# Patient Record
Sex: MALE | Race: OTHER
[De-identification: names, ages, dates, MRNs, and addresses within clinical notes are randomized per-mention and may not be internally consistent; named-entity substitution may affect disease eponyms.]

---

## 2018-03-05 ENCOUNTER — HOSPITAL ENCOUNTER (EMERGENCY)
Dept: HOSPITAL 8 - ED | Age: 28
Discharge: HOME | End: 2018-03-05
Payer: COMMERCIAL

## 2018-03-05 VITALS — WEIGHT: 305.34 LBS | HEIGHT: 74 IN | BODY MASS INDEX: 39.19 KG/M2

## 2018-03-05 VITALS — DIASTOLIC BLOOD PRESSURE: 94 MMHG | SYSTOLIC BLOOD PRESSURE: 160 MMHG

## 2018-03-05 DIAGNOSIS — R21: Primary | ICD-10-CM

## 2018-03-05 PROCEDURE — 99283 EMERGENCY DEPT VISIT LOW MDM: CPT

## 2023-01-26 PROBLEM — S62.213A: Status: ACTIVE | Noted: 2023-01-26

## 2025-03-19 ENCOUNTER — OFFICE VISIT (OUTPATIENT)
Dept: URGENT CARE | Facility: PHYSICIAN GROUP | Age: 35
End: 2025-03-19
Payer: COMMERCIAL

## 2025-03-19 ENCOUNTER — HOSPITAL ENCOUNTER (OUTPATIENT)
Facility: MEDICAL CENTER | Age: 35
End: 2025-03-19
Attending: PHYSICIAN ASSISTANT
Payer: COMMERCIAL

## 2025-03-19 VITALS
TEMPERATURE: 97.7 F | SYSTOLIC BLOOD PRESSURE: 132 MMHG | RESPIRATION RATE: 16 BRPM | BODY MASS INDEX: 32.25 KG/M2 | HEIGHT: 72 IN | HEART RATE: 82 BPM | WEIGHT: 238.1 LBS | DIASTOLIC BLOOD PRESSURE: 80 MMHG | OXYGEN SATURATION: 98 %

## 2025-03-19 DIAGNOSIS — N50.812 LEFT TESTICULAR PAIN: ICD-10-CM

## 2025-03-19 LAB
APPEARANCE UR: CLEAR
BILIRUB UR STRIP-MCNC: NEGATIVE MG/DL
COLOR UR AUTO: YELLOW
GLUCOSE UR STRIP.AUTO-MCNC: NEGATIVE MG/DL
KETONES UR STRIP.AUTO-MCNC: NEGATIVE MG/DL
LEUKOCYTE ESTERASE UR QL STRIP.AUTO: NEGATIVE
NITRITE UR QL STRIP.AUTO: NEGATIVE
PH UR STRIP.AUTO: 6.5 [PH] (ref 5–8)
PROT UR QL STRIP: NEGATIVE MG/DL
RBC UR QL AUTO: NEGATIVE
SP GR UR STRIP.AUTO: 1.02
UROBILINOGEN UR STRIP-MCNC: 1 MG/DL

## 2025-03-19 PROCEDURE — 81002 URINALYSIS NONAUTO W/O SCOPE: CPT | Performed by: PHYSICIAN ASSISTANT

## 2025-03-19 PROCEDURE — 3075F SYST BP GE 130 - 139MM HG: CPT | Performed by: PHYSICIAN ASSISTANT

## 2025-03-19 PROCEDURE — 87491 CHLMYD TRACH DNA AMP PROBE: CPT

## 2025-03-19 PROCEDURE — 99204 OFFICE O/P NEW MOD 45 MIN: CPT | Performed by: PHYSICIAN ASSISTANT

## 2025-03-19 PROCEDURE — 87591 N.GONORRHOEAE DNA AMP PROB: CPT

## 2025-03-19 PROCEDURE — 3079F DIAST BP 80-89 MM HG: CPT | Performed by: PHYSICIAN ASSISTANT

## 2025-03-19 RX ORDER — DOXYCYCLINE HYCLATE 100 MG
100 TABLET ORAL 2 TIMES DAILY
Qty: 14 TABLET | Refills: 0 | Status: SHIPPED | OUTPATIENT
Start: 2025-03-19 | End: 2025-03-26

## 2025-03-19 ASSESSMENT — ENCOUNTER SYMPTOMS
CHILLS: 0
NAUSEA: 0
VOMITING: 0
FEVER: 0
FLANK PAIN: 0
ABDOMINAL PAIN: 0

## 2025-03-20 LAB
C TRACH DNA SPEC QL NAA+PROBE: NEGATIVE
N GONORRHOEA DNA SPEC QL NAA+PROBE: NEGATIVE
SPECIMEN SOURCE: NORMAL

## 2025-03-20 NOTE — PROGRESS NOTES
"Subjective:     CHIEF COMPLAINT  Chief Complaint   Patient presents with    Testicle Pain     Left testicle pain that started 1 day ago patient states did have unprotected sex x4 days ago with a new partner.        HPI  Drake Farfan is a very pleasant 34 y.o. male who presents to the clinic with left testicular pain x 1 day.  Patient states he had unprotected sex with a new partner 4 days ago.  Yesterday he developed a dull ache behind his left testicle.  States it feels like \"blue balls\".  He has not noted any scrotal swelling, rash or discoloration.  Denies any dysuria, hematuria or penile discharge.  No history of nephrolithiasis.  Denies any prior hernia or noticing a bulge.  No fevers, chills, nausea or vomiting.    REVIEW OF SYSTEMS  Review of Systems   Constitutional:  Negative for chills, fever and malaise/fatigue.   Gastrointestinal:  Negative for abdominal pain, nausea and vomiting.   Genitourinary:  Negative for dysuria, flank pain, frequency, hematuria and urgency.        Left testicular pain       PAST MEDICAL HISTORY  Patient Active Problem List    Diagnosis Date Noted    Almanzar's fracture 01/26/2023       SURGICAL HISTORY   has a past surgical history that includes closed reduction, fracture, metacarpal bone, with pinning (Right, 2/3/2023).    ALLERGIES  No Known Allergies    CURRENT MEDICATIONS  Home Medications       Reviewed by Estrada Ferguson P.A.-C. (Physician Assistant) on 03/19/25 at 1716  Med List Status: <None>     Medication Last Dose Status        Patient Khalif Taking any Medications                           SOCIAL HISTORY  Social History     Tobacco Use    Smoking status: Former     Types: Cigarettes    Smokeless tobacco: Former   Vaping Use    Vaping status: Never Used   Substance and Sexual Activity    Alcohol use: Yes    Drug use: Not Currently    Sexual activity: Yes     Partners: Female     Birth control/protection: None       FAMILY HISTORY  History reviewed. No pertinent " family history.       Objective:     VITAL SIGNS: /80   Pulse 82   Temp 36.5 °C (97.7 °F) (Temporal)   Resp 16   Ht 1.829 m (6')   Wt 108 kg (238 lb 1.6 oz)   SpO2 98%   BMI 32.29 kg/m²     PHYSICAL EXAM  Physical Exam  Constitutional:       General: He is not in acute distress.     Appearance: Normal appearance. He is not ill-appearing, toxic-appearing or diaphoretic.   HENT:      Head: Normocephalic and atraumatic.   Eyes:      Conjunctiva/sclera: Conjunctivae normal.   Pulmonary:      Effort: Pulmonary effort is normal.   Genitourinary:     Comments: Patient declined  exam  Musculoskeletal:      Cervical back: Normal range of motion.   Neurological:      General: No focal deficit present.      Mental Status: He is alert and oriented to person, place, and time. Mental status is at baseline.         Assessment/Plan:     1. Left testicular pain  - POCT Urinalysis  - Chlamydia/GC, PCR (Urine); Future  - cefTRIAXone (Rocephin) 500 mg in lidocaine (Xylocaine) 1 % 2 mL for IM use  - doxycycline (VIBRAMYCIN) 100 MG Tab; Take 1 Tablet by mouth 2 times a day for 7 days.  Dispense: 14 Tablet; Refill: 0      MDM/Comments:    Pleasant and well-appearing 34-year-old male presented to the clinic with left testicular pain x 1 day.  Patient states the pain is located behind his left testicle.  He denies any associated scrotal swelling.  Denies any dysuria or penile discharge.  Has not noted any mass or bulge.  Patient declined  exam.  Urinalysis returned reassuring without signs of infection.  No hematuria.  Recently sexually active with a new partner.  We discussed differentials including epididymitis, testicular torsion, varicocele, spermatocele, STI and nephrolithiasis.  We elected to begin empiric treatment for epididymitis with a course of Rocephin and doxycycline.  We will send out for GC chlamydia testing and I will follow-up once available.  Return/ED precautions discussed.    Differential diagnosis,  natural history, supportive care, and indications for immediate follow-up discussed. All questions answered. Patient agrees with the plan of care.    Follow-up as needed if symptoms worsen or fail to improve to PCP, Urgent care or Emergency Room.    I have personally reviewed prior external notes and test results pertinent to today's visit.  I have independently reviewed and interpreted all diagnostics ordered during this urgent care acute visit.   Discussed management options (risks,benefits, and alternatives to treatment). Pt expresses understanding and the treatment plan was agreed upon. Questions were encouraged and answered to pt's satisfaction.    Please note that this dictation was created using voice recognition software. I have made a reasonable attempt to correct obvious errors, but I expect that there are errors of grammar and possibly content that I did not discover before finalizing the note.